# Patient Record
Sex: FEMALE
[De-identification: names, ages, dates, MRNs, and addresses within clinical notes are randomized per-mention and may not be internally consistent; named-entity substitution may affect disease eponyms.]

---

## 2018-06-07 ENCOUNTER — HOSPITAL ENCOUNTER (OUTPATIENT)
Dept: HOSPITAL 5 - OR | Age: 53
Discharge: HOME | End: 2018-06-07
Attending: UROLOGY
Payer: COMMERCIAL

## 2018-06-07 VITALS — SYSTOLIC BLOOD PRESSURE: 113 MMHG | DIASTOLIC BLOOD PRESSURE: 55 MMHG

## 2018-06-07 DIAGNOSIS — I10: ICD-10-CM

## 2018-06-07 DIAGNOSIS — N20.0: Primary | ICD-10-CM

## 2018-06-07 DIAGNOSIS — Z88.5: ICD-10-CM

## 2018-06-07 PROCEDURE — 50590 FRAGMENTING OF KIDNEY STONE: CPT

## 2018-06-07 NOTE — SHORT STAY SUMMARY
Short Stay Documentation


Date of service: 06/07/18





- History


H&P: obtained from office





- Allergies and Medications


Current Medications: 


 Allergies





oxycodone [From Percocet] Allergy (Verified 05/30/18 13:25)


 Vomiting





 Home Medications











 Medication  Instructions  Recorded  Confirmed  Last Taken  Type


 


HYDROcodone/APAP 5-325 [[place] 1 each PO Q6HR PRN 05/30/18 06/07/18 06/05/18 

History





5/325]     


 


Ibuprofen [Advil 100 MG tab] 200 mg PO Q6H PRN 05/30/18 06/07/18 1 Week Ago 

History





    ~05/31/18 








Active Medications





Cefazolin Sodium (Ancef/Sterile Water 2 Gm/20 Ml)  2 gm IV PREOP NR


   Stop: 06/07/18 19:00


Famotidine (Pepcid)  20 mg IV PREOP NR


   Stop: 06/07/18 23:59


   Last Admin: 06/07/18 10:44 Dose:  20 mg


Sodium Chloride (Nacl 0.9% 1000 Ml)  1,000 mls @ 100 mls/hr IV AS DIRECT JOSAFAT


Midazolam HCl (Versed)  2 mg IV PREOP NR


   Stop: 06/07/18 23:59


   Last Admin: 06/07/18 10:46 Dose:  2 mg











- Brief post op/procedure progress note


Date of procedure: 06/07/18


Pre-op diagnosis: left renal stone 9mm


Post-op diagnosis: same


Procedure: 





left renal eswl


Anesthesia: GETA


Findings: 





good vis , mod frag


Surgeon: AMBREEN SINGH


Estimated blood loss: minimal


Pathology: none


Condition: stable





- Hospital course


Hospital course: 





or pacu home





- Disposition


Condition at discharge: Good


Disposition: DC-01 TO HOME OR SELFCARE





Short Stay Discharge Plan


Activity: advance as tolerated


Diet: advance as tolerated


Follow up with: 


AMBREEN SINGH MD [Staff Physician] - 7 Days


Forms:  [facility]


Prescriptions: 


Cefuroxime [Ceftin] 500 mg PO Q12H #10 tablet


HYDROcodone/APAP 5-325 [[place] 5-325 mg TAB] 1 each PO Q4HR PRN #25 tablet


 PRN Reason: Pain

## 2018-06-07 NOTE — ANESTHESIA CONSULTATION
Anesthesia Consult and Med Hx


Date of service: 06/07/18





- Airway


Anesthetic Teeth Evaluation: Good


ROM Head & Neck: Adequate


Mental/Hyoid Distance: Adequate


Mallampati Class: Class II


Intubation Access Assessment: Probably Good





- Pre-Operative Health Status


ASA Pre-Surgery Classification: ASA1


Proposed Anesthetic Plan: General





- Pulmonary


Hx Smoking: No


Hx Sleep Apnea: No (JEN PRE SCREEN LOW RISK)





- Cardiovascular System


Hx Hypertension: No





- Central Nervous System


Hx Back Pain: Yes





- Endocrine


Hx Renal Disease: Yes (kidney stone)





- Other Systems


Hx Cancer: No

## 2018-06-17 NOTE — OPERATIVE REPORT
PREOPERATIVE DIAGNOSIS:  Left renal stone, 9 mm.



POSTOPERATIVE DIAGNOSIS:  Left renal stone, 9 mm.



PROCEDURE:  Left renal ESWL.



ANESTHESIA:  General.



FINDINGS:  Good visualization, moderate fragmentation.



SURGEON:  Jigar Bui MD.



ESTIMATED BLOOD LOSS:  Minimal.



PATHOLOGY:  None.



CONDITION:  Stable.



HOSPITAL COURSE:  ____.



CLINICAL INDICATIONS:  The patient counseled RCBA, antibiotics, SCDs.



DESCRIPTION OF PROCEDURE:  The patient transferred to OR suite in supine

position.  Biplanar fluoroscopy was used to target the left stone with an F2. 

There was good visualization.  A total of 2500 shocks were delivered at maximum

5 kilovolts.  Intermittent repositioning done as necessary.  At the end of the

procedure, moderate fragmentation.  The patient awakened and transferred to PACU

in good and stable condition.





DD: 06/17/2018 22:12

DT: 06/17/2018 22:34

JOB# [id number]  [id number]

ATS/NTS